# Patient Record
Sex: FEMALE | Race: WHITE | URBAN - METROPOLITAN AREA
[De-identification: names, ages, dates, MRNs, and addresses within clinical notes are randomized per-mention and may not be internally consistent; named-entity substitution may affect disease eponyms.]

---

## 2017-07-25 DIAGNOSIS — I10 HTN, GOAL BELOW 140/90: ICD-10-CM

## 2017-07-26 NOTE — TELEPHONE ENCOUNTER
"Routing refill request to provider for review/approval because:  Patient needs to be seen because it has been more than 1 year since last office visit.  I have already attempted to reach the patient by phone. I called every number on file all of them state \"phone number you dialed is out of service.\" I was able to leave a message on the \"work phone\" but do know if it belongs to patient or not so Id did not leave any details.     There appears to be no activity on the patient's chart since 04/2015    Patient has no PCP listed, but Dr. Orellana was the last provider she saw.     triamterene-hydrochlorothiazide (MAXZIDE-25) 37.5-25 MG per tablet   Last Written Prescription Date: 3/15/2015  Last Fill Quantity: 90, # refills: 1  Last Office Visit with G, [facility] or [facility] prescribing provider: 3/16/2015       Potassium   Date Value Ref Range Status   03/16/2015 3.6 3.4 - 5.3 mmol/L Final     Creatinine   Date Value Ref Range Status   03/16/2015 0.89 0.52 - 1.04 mg/dL Final     BP Readings from Last 3 Encounters:   03/16/15 122/70   11/11/14 132/66   07/15/14 132/85     Gena Leyva RN    "

## 2017-07-26 NOTE — TELEPHONE ENCOUNTER
Unable to refill.  Last OV with this writer-3/16/15.  Patient needs to to be seen for physical, fasting labs, medication recheck  Please check that the refill request is coming from

## 2017-07-27 RX ORDER — TRIAMTERENE/HYDROCHLOROTHIAZID 37.5-25 MG
TABLET ORAL
Qty: 1 TABLET | Refills: 0 | OUTPATIENT
Start: 2017-07-27

## 2017-07-27 NOTE — TELEPHONE ENCOUNTER
Spoke with patient, she reports that she recently moved to [place] and plans to reside there long-term. Patient reports that prior to moving she was being cared for by [facility] in [place]. Patient states she does not know how or why the BP medication refill request came to our clinic.     Writer sent pharmacy a note stating patient no longer seen at out clinic and to send all refill requests to the [facility] in [place] per patient request.     Gena Leyva RN

## 2019-04-25 ENCOUNTER — NEW PATIENT (OUTPATIENT)
Dept: URBAN - METROPOLITAN AREA CLINIC 24 | Facility: CLINIC | Age: 64
End: 2019-04-25
Payer: COMMERCIAL

## 2019-04-25 DIAGNOSIS — G43.B0 COMBINED FORMS OF AGE-RELATED CATARACT, BILATERAL: Primary | ICD-10-CM

## 2019-04-25 DIAGNOSIS — H25.813 COMBINED FORMS OF AGE-RELATED CATARACT, BILATERAL: ICD-10-CM

## 2019-04-25 PROCEDURE — 92134 CPTRZ OPH DX IMG PST SGM RTA: CPT | Performed by: OPTOMETRIST

## 2019-04-25 PROCEDURE — 92004 COMPRE OPH EXAM NEW PT 1/>: CPT | Performed by: OPTOMETRIST

## 2019-04-25 ASSESSMENT — VISUAL ACUITY
OD: 20/20
OS: 20/20

## 2019-04-25 ASSESSMENT — INTRAOCULAR PRESSURE
OS: 17
OD: 19

## 2019-04-25 ASSESSMENT — KERATOMETRY
OD: 45.30
OS: 45.30

## 2019-11-03 ENCOUNTER — HEALTH MAINTENANCE LETTER (OUTPATIENT)
Age: 64
End: 2019-11-03

## 2020-11-16 ENCOUNTER — HEALTH MAINTENANCE LETTER (OUTPATIENT)
Age: 65
End: 2020-11-16

## 2021-09-18 ENCOUNTER — HEALTH MAINTENANCE LETTER (OUTPATIENT)
Age: 66
End: 2021-09-18

## 2021-11-13 ENCOUNTER — HEALTH MAINTENANCE LETTER (OUTPATIENT)
Age: 66
End: 2021-11-13

## 2022-01-08 ENCOUNTER — HEALTH MAINTENANCE LETTER (OUTPATIENT)
Age: 67
End: 2022-01-08

## 2022-11-20 ENCOUNTER — HEALTH MAINTENANCE LETTER (OUTPATIENT)
Age: 67
End: 2022-11-20

## 2023-11-19 ENCOUNTER — HEALTH MAINTENANCE LETTER (OUTPATIENT)
Age: 68
End: 2023-11-19

## 2024-01-28 ENCOUNTER — HEALTH MAINTENANCE LETTER (OUTPATIENT)
Age: 69
End: 2024-01-28